# Patient Record
Sex: FEMALE | Race: OTHER | HISPANIC OR LATINO | Employment: UNEMPLOYED | URBAN - METROPOLITAN AREA
[De-identification: names, ages, dates, MRNs, and addresses within clinical notes are randomized per-mention and may not be internally consistent; named-entity substitution may affect disease eponyms.]

---

## 2023-01-03 ENCOUNTER — APPOINTMENT (EMERGENCY)
Dept: ULTRASOUND IMAGING | Facility: HOSPITAL | Age: 28
End: 2023-01-03

## 2023-01-03 ENCOUNTER — HOSPITAL ENCOUNTER (EMERGENCY)
Facility: HOSPITAL | Age: 28
Discharge: HOME | End: 2023-01-03
Attending: INTERNAL MEDICINE

## 2023-01-03 VITALS
DIASTOLIC BLOOD PRESSURE: 70 MMHG | TEMPERATURE: 98.2 F | RESPIRATION RATE: 18 BRPM | SYSTOLIC BLOOD PRESSURE: 125 MMHG | HEART RATE: 88 BPM | WEIGHT: 211.8 LBS | OXYGEN SATURATION: 98 %

## 2023-01-03 DIAGNOSIS — L08.9 SKIN INFECTION: ICD-10-CM

## 2023-01-03 DIAGNOSIS — R10.2 PELVIC PAIN: ICD-10-CM

## 2023-01-03 DIAGNOSIS — O23.40 UTI (URINARY TRACT INFECTION) DURING PREGNANCY: ICD-10-CM

## 2023-01-03 DIAGNOSIS — O20.0 THREATENED MISCARRIAGE: Primary | ICD-10-CM

## 2023-01-03 LAB
ABO GROUP BLD: NORMAL
ALBUMIN SERPL BCP-MCNC: 3.4 G/DL (ref 3.5–5)
ALP SERPL-CCNC: 73 U/L (ref 43–122)
ALT SERPL W P-5'-P-CCNC: 34 U/L
ANION GAP SERPL CALCULATED.3IONS-SCNC: 5 MMOL/L (ref 5–14)
AST SERPL W P-5'-P-CCNC: 22 U/L (ref 14–36)
B-HCG SERPL-ACNC: ABNORMAL MIU/ML
BACTERIA UR QL AUTO: ABNORMAL /HPF
BASOPHILS # BLD AUTO: 0.03 THOUSANDS/ÂΜL (ref 0–0.1)
BASOPHILS NFR BLD AUTO: 0 % (ref 0–1)
BILIRUB SERPL-MCNC: 0.44 MG/DL (ref 0.2–1)
BILIRUB UR QL STRIP: NEGATIVE
BLD GP AB SCN SERPL QL: NEGATIVE
BUN SERPL-MCNC: 11 MG/DL (ref 5–25)
CALCIUM ALBUM COR SERPL-MCNC: 9.1 MG/DL (ref 8.3–10.1)
CALCIUM SERPL-MCNC: 8.6 MG/DL (ref 8.4–10.2)
CHLORIDE SERPL-SCNC: 106 MMOL/L (ref 96–108)
CLARITY UR: CLEAR
CO2 SERPL-SCNC: 25 MMOL/L (ref 21–32)
COLOR UR: ABNORMAL
CREAT SERPL-MCNC: 0.65 MG/DL (ref 0.6–1.2)
EOSINOPHIL # BLD AUTO: 0.11 THOUSAND/ÂΜL (ref 0–0.61)
EOSINOPHIL NFR BLD AUTO: 1 % (ref 0–6)
ERYTHROCYTE [DISTWIDTH] IN BLOOD BY AUTOMATED COUNT: 12.6 % (ref 11.6–15.1)
GFR SERPL CREATININE-BSD FRML MDRD: 122 ML/MIN/1.73SQ M
GLUCOSE SERPL-MCNC: 98 MG/DL (ref 70–99)
GLUCOSE UR STRIP-MCNC: NEGATIVE MG/DL
HCT VFR BLD AUTO: 32.4 % (ref 34.8–46.1)
HGB BLD-MCNC: 10.4 G/DL (ref 11.5–15.4)
HGB UR QL STRIP.AUTO: 25
IMM GRANULOCYTES # BLD AUTO: 0.03 THOUSAND/UL (ref 0–0.2)
IMM GRANULOCYTES NFR BLD AUTO: 0 % (ref 0–2)
KETONES UR STRIP-MCNC: NEGATIVE MG/DL
LEUKOCYTE ESTERASE UR QL STRIP: 25
LIPASE SERPL-CCNC: 65 U/L (ref 23–300)
LYMPHOCYTES # BLD AUTO: 1.8 THOUSANDS/ÂΜL (ref 0.6–4.47)
LYMPHOCYTES NFR BLD AUTO: 23 % (ref 14–44)
MCH RBC QN AUTO: 27.7 PG (ref 26.8–34.3)
MCHC RBC AUTO-ENTMCNC: 32.1 G/DL (ref 31.4–37.4)
MCV RBC AUTO: 86 FL (ref 82–98)
MONOCYTES # BLD AUTO: 0.64 THOUSAND/ÂΜL (ref 0.17–1.22)
MONOCYTES NFR BLD AUTO: 8 % (ref 4–12)
MUCOUS THREADS UR QL AUTO: ABNORMAL
NEUTROPHILS # BLD AUTO: 5.3 THOUSANDS/ÂΜL (ref 1.85–7.62)
NEUTS SEG NFR BLD AUTO: 68 % (ref 43–75)
NITRITE UR QL STRIP: NEGATIVE
NON-SQ EPI CELLS URNS QL MICRO: ABNORMAL /HPF
NRBC BLD AUTO-RTO: 0 /100 WBCS
PH UR STRIP.AUTO: 6 [PH]
PLATELET # BLD AUTO: 259 THOUSANDS/UL (ref 149–390)
PMV BLD AUTO: 9 FL (ref 8.9–12.7)
POTASSIUM SERPL-SCNC: 3.9 MMOL/L (ref 3.5–5.3)
PROT SERPL-MCNC: 6.9 G/DL (ref 6.4–8.4)
PROT UR STRIP-MCNC: ABNORMAL MG/DL
RBC # BLD AUTO: 3.76 MILLION/UL (ref 3.81–5.12)
RBC #/AREA URNS AUTO: ABNORMAL /HPF
RH BLD: POSITIVE
SODIUM SERPL-SCNC: 136 MMOL/L (ref 135–147)
SP GR UR STRIP.AUTO: 1.02 (ref 1–1.04)
SPECIMEN EXPIRATION DATE: NORMAL
UROBILINOGEN UA: NEGATIVE MG/DL
WBC # BLD AUTO: 7.91 THOUSAND/UL (ref 4.31–10.16)
WBC #/AREA URNS AUTO: ABNORMAL /HPF

## 2023-01-03 RX ORDER — CEPHALEXIN 500 MG/1
500 CAPSULE ORAL EVERY 12 HOURS SCHEDULED
Qty: 14 CAPSULE | Refills: 0 | Status: SHIPPED | OUTPATIENT
Start: 2023-01-03 | End: 2023-01-10

## 2023-01-03 NOTE — ED PROVIDER NOTES
History  Chief Complaint   Patient presents with   • Vaginal Bleeding - Pregnant     States LMP 22, found out pregnant about 4 weeks ago  Today started with vaginal bleeding and lower abd pain  • Abscess     Also has large open abscess to lower left abd  Area with puss drainage  States x3-4 days     HPI  28-year-old female who is  presents to ED for evaluation of lower abdominal cramping and vaginal bleeding  Patient reports her last menstrual cycle was 2022  She reports mild lower abdominal cramping  She also reports mild vaginal bleeding this morning  She has not seen an OB/GYN for this pregnancy yet  She reports 3 other previous healthy pregnancies  She reports 1   Patient also reports 1 small area in her left mid abdomen that has been draining  Contrary to chief complaint, the area is very small  Contrary to chief complaint, patient reports drainage for 2 days  She denies any pain  She states she has been putting gauze over it which helps  She denies any medications or trying any other therapies  Patient denies headache, visual changes, dizziness, fevers, chills, chest pain, palpitations, diarrhea, melena, hematochezia, dysuria, or numbness or tingling of the extremities  Patient was interviewed and examined with     None       History reviewed  No pertinent past medical history  History reviewed  No pertinent surgical history  History reviewed  No pertinent family history  I have reviewed and agree with the history as documented  E-Cigarette/Vaping     E-Cigarette/Vaping Substances     Social History     Tobacco Use   • Smoking status: Never   • Smokeless tobacco: Never   Substance Use Topics   • Alcohol use: Never   • Drug use: Never       Review of Systems   All other systems reviewed and are negative  Physical Exam  Physical Exam  Vitals and nursing note reviewed  Constitutional:       General: She is not in acute distress  Appearance: She is well-developed  HENT:      Head: Normocephalic and atraumatic  Eyes:      Conjunctiva/sclera: Conjunctivae normal    Cardiovascular:      Rate and Rhythm: Normal rate and regular rhythm  Heart sounds: No murmur heard  Pulmonary:      Effort: Pulmonary effort is normal  No respiratory distress  Breath sounds: Normal breath sounds  Abdominal:      Palpations: Abdomen is soft  Tenderness: There is no abdominal tenderness  Comments: Small area of fluctuance with open drainage, mild surrounding erythema, no streaking   Musculoskeletal:         General: No swelling  Cervical back: Neck supple  Skin:     General: Skin is warm and dry  Capillary Refill: Capillary refill takes less than 2 seconds  Findings: Abscess present  Neurological:      Mental Status: She is alert     Psychiatric:         Mood and Affect: Mood normal          Vital Signs  ED Triage Vitals [01/03/23 0823]   Temperature Pulse Respirations Blood Pressure SpO2   98 2 °F (36 8 °C) 90 20 127/66 98 %      Temp Source Heart Rate Source Patient Position - Orthostatic VS BP Location FiO2 (%)   Oral Monitor Sitting Left arm --      Pain Score       --           Vitals:    01/03/23 0823 01/03/23 1037   BP: 127/66 125/70   Pulse: 90 88   Patient Position - Orthostatic VS: Sitting Lying         Visual Acuity      ED Medications  Medications - No data to display    Diagnostic Studies  Results Reviewed     Procedure Component Value Units Date/Time    Urine Microscopic [[id number]]  (Abnormal) Collected: 01/03/23 0847    Lab Status: Final result Specimen: Urine, Clean Catch Updated: 01/03/23 1038     RBC, UA 1-2 /hpf      WBC, UA 1-2 /hpf      Epithelial Cells Moderate /hpf      Bacteria, UA Moderate /hpf      MUCUS THREADS Moderate     URINE COMMENT --    hCG, quantitative [[id number]]  (Abnormal) Collected: 01/03/23 0854    Lab Status: Final result Specimen: Blood from Arm, Right Updated: 01/03/23 9392 HCG, Quant 61,288 mIU/mL     Narrative:       Expected Ranges:     Approximate               Approximate HCG  Gestation age          Concentration ( mIU/mL)  _____________          ______________________   Luis Woodall                      HCG values  0 2-1                       5-50  1-2                           2-3                         100-5000  3-4                         500-37107  4-5                         1000-04954  5-6                         [id number]  6-8                         61244-907133  8-12                        [id number]      UA w Reflex to Microscopic w Reflex to Culture [[id number]]  (Abnormal) Collected: 01/03/23 0847    Lab Status: Final result Specimen: Urine, Clean Catch Updated: 01/03/23 0916     Color, UA Janice     Clarity, UA Clear     Specific [place], UA 1 020     pH, UA 6 0     Leukocytes, UA 25 0     Nitrite, UA Negative     Protein, UA 15 (Trace) mg/dl      Glucose, UA Negative mg/dl      Ketones, UA Negative mg/dl      Bilirubin, UA Negative     Occult Blood, UA 25 0     URINE COMMENT --     UROBILINOGEN UA Negative mg/dL     Urine culture [[id number]] Collected: 01/03/23 0847    Lab Status:  In process Specimen: Urine, Clean Catch Updated: 01/03/23 0916    Lipase [[id number]]  (Normal) Collected: 01/03/23 0854    Lab Status: Final result Specimen: Blood from Arm, Right Updated: 01/03/23 0910     Lipase 65 u/L     Comprehensive metabolic panel [[id number]]  (Abnormal) Collected: 01/03/23 0854    Lab Status: Final result Specimen: Blood from Arm, Right Updated: 01/03/23 0910     Sodium 136 mmol/L      Potassium 3 9 mmol/L      Chloride 106 mmol/L      CO2 25 mmol/L      ANION GAP 5 mmol/L      BUN 11 mg/dL      Creatinine 0 65 mg/dL      Glucose 98 mg/dL      Calcium 8 6 mg/dL      Corrected Calcium 9 1 mg/dL      AST 22 U/L      ALT 34 U/L      Alkaline Phosphatase 73 U/L      Total Protein 6 9 g/dL      Albumin 3 4 g/dL      Total Bilirubin 0 44 mg/dL      eGFR 122 ml/min/1 73sq m     Narrative:      National Kidney Disease Foundation guidelines for Chronic Kidney Disease (CKD):   •  Stage 1 with normal or high GFR (GFR > 90 mL/min/1 73 square meters)  •  Stage 2 Mild CKD (GFR = 60-89 mL/min/1 73 square meters)  •  Stage 3A Moderate CKD (GFR = 45-59 mL/min/1 73 square meters)  •  Stage 3B Moderate CKD (GFR = 30-44 mL/min/1 73 square meters)  •  Stage 4 Severe CKD (GFR = 15-29 mL/min/1 73 square meters)  •  Stage 5 End Stage CKD (GFR <15 mL/min/1 73 square meters)  Note: GFR calculation is accurate only with a steady state creatinine    CBC and differential [[id number]]  (Abnormal) Collected: 01/03/23 0854    Lab Status: Final result Specimen: Blood from Arm, Right Updated: 01/03/23 0900     WBC 7 91 Thousand/uL      RBC 3 76 Million/uL      Hemoglobin 10 4 g/dL      Hematocrit 32 4 %      MCV 86 fL      MCH 27 7 pg      MCHC 32 1 g/dL      RDW 12 6 %      MPV 9 0 fL      Platelets 099 Thousands/uL      nRBC 0 /100 WBCs      Neutrophils Relative 68 %      Immat GRANS % 0 %      Lymphocytes Relative 23 %      Monocytes Relative 8 %      Eosinophils Relative 1 %      Basophils Relative 0 %      Neutrophils Absolute 5 30 Thousands/µL      Immature Grans Absolute 0 03 Thousand/uL      Lymphocytes Absolute 1 80 Thousands/µL      Monocytes Absolute 0 64 Thousand/µL      Eosinophils Absolute 0 11 Thousand/µL      Basophils Absolute 0 03 Thousands/µL                  US OB < 14 weeks with transvaginal   Final Result by Barbie Ridley MD (01/03 1013)      Single live intrauterine gestation at 7 weeks 5 days       MAME of 8/17/2023              Workstation performed: [id number]                    Procedures  Procedures         ED Course  ED Course as of 01/03/23 1145   Tue Jan 03, 2023   1008 HCG QUANTITATIVE(!): 61,288   1008 Rh Factor: Positive   1009 Hemoglobin(!): 10 4  No previous Hb, would recommend rechecking at next visit or sooner if she becomes symptomatic    1010 Leukocytes, UA(!): 25 0   1010 Blood, UA(!): 25 0   1028  OB IMPRESSION:  Single live intrauterine gestation at 7 weeks 5 days                                SBIRT 22yo+    Flowsheet Row Most Recent Value   SBIRT (23 yo +)    In order to provide better care to our patients, we are screening all of our patients for alcohol and drug use  Would it be okay to ask you these screening questions? No Filed at: 2023 8672                    Medical Decision Making  31-year-old female who is  presents to ED for evaluation of lower abdominal cramping and vaginal bleeding  Presentation most consistent with threatened miscarriage  Will rule out ectopic pregnancy as well  Will check UA for asymptomatic UTI in pregnancy  Will obtain labs  Patient has no pain out of proportion to exam or worsening of pain with or shortly after eating  Pain not colicky  No peritoneal signs on exam   Patient has no pulsatile abdominal mass, known aneurysm, pulse deficit or asymmetry, tearing or ripping pain  No tenderness at McBurney's point and no positive Barrera sign, or Rovsing sign  No radiation of pain from flank to groin, CVA tenderness, urinary complains, or history of urolithiasis  No history of abdominal trauma or sickle cell disease  Skin infection: self-limited or minor problem  Threatened miscarriage: undiagnosed new problem with uncertain prognosis     Details: Patient struck to follow-up with OB/GYN in 2 to 3 days  Return to the ER for any new or worsening symptoms  UTI (urinary tract infection) during pregnancy: acute illness or injury     Details: Patient starting Keflex  Amount and/or Complexity of Data Reviewed  External Data Reviewed: labs and radiology  Labs: ordered  Decision-making details documented in ED Course  Radiology: ordered  Decision-making details documented in ED Course  Risk  OTC drugs  Prescription drug management            Disposition  Final diagnoses:   Threatened miscarriage UTI (urinary tract infection) during pregnancy   Skin infection     Time reflects when diagnosis was documented in both MDM as applicable and the Disposition within this note     Time User Action Codes Description Comment    1/3/2023  9:55 AM Nicole Crisp R Add [R10 2] Pelvic pain     1/3/2023 10:48 AM Dorice Lawn Add [O20 0] Threatened miscarriage     1/3/2023 10:49 AM Dorice Lawn Add [O23 40] UTI (urinary tract infection) during pregnancy     1/3/2023 11:09 AM Dorice Lawn Add [L08 9] Skin infection       ED Disposition     ED Disposition   Discharge    Condition   Stable    Date/Time   Tue Boogie 3, 2023 10:48 AM    Comment   Anuja Parada discharge to home/self care  Follow-up Information     Follow up With Specialties Details Why Contact Info Additional [address] [address] [facility] Schedule an appointment as soon as possible for a visit in 2 days  [address] [facility] [address]  [address], [address] [phone]          Discharge Medication List as of 1/3/2023 10:51 AM      START taking these medications    Details   cephalexin (KEFLEX) 500 mg capsule Take 1 capsule (500 mg total) by mouth every 12 (twelve) hours for 7 days, Starting Tue 1/3/2023, Until Tue 1/10/2023, Normal             No discharge procedures on file      PDMP Review     None          ED Provider  Electronically Signed by           Jazzy Pena MD  01/03/23 0664

## 2023-01-03 NOTE — Clinical Note
Richbetsey English was seen and treated in our emergency department on 1/3/2023  Diagnosis:     David Reyes  is off the rest of the shift today  She may return on this date: If you have any questions or concerns, please don't hesitate to call        Yolande Balbuena MD    ______________________________           _______________          _______________  [facility] Representative                              Date                                Time

## 2023-01-04 LAB — BACTERIA UR CULT: NORMAL

## 2023-01-24 ENCOUNTER — HOSPITAL ENCOUNTER (EMERGENCY)
Facility: HOSPITAL | Age: 28
Discharge: HOME | End: 2023-01-24
Attending: STUDENT IN AN ORGANIZED HEALTH CARE EDUCATION/TRAINING PROGRAM

## 2023-01-24 VITALS
WEIGHT: 206.9 LBS | RESPIRATION RATE: 16 BRPM | TEMPERATURE: 98 F | SYSTOLIC BLOOD PRESSURE: 129 MMHG | OXYGEN SATURATION: 99 % | DIASTOLIC BLOOD PRESSURE: 62 MMHG | HEART RATE: 76 BPM

## 2023-01-24 DIAGNOSIS — L03.90 CELLULITIS: Primary | ICD-10-CM

## 2023-01-24 RX ORDER — AMOXICILLIN AND CLAVULANATE POTASSIUM 875; 125 MG/1; MG/1
1 TABLET, FILM COATED ORAL EVERY 12 HOURS
Qty: 14 TABLET | Refills: 0 | Status: SHIPPED | OUTPATIENT
Start: 2023-01-24 | End: 2023-01-31

## 2023-01-24 NOTE — ED PROVIDER NOTES
History  Chief Complaint   Patient presents with   • Abscess     Notes past week of developing abscess on stomach  First they came out on stomach and then on the 3rd right finger; noted hand swelling and redness around abscess on hand; healing over on stomach  Patient is a 52-year-old female who believes she is about 10 to 12 weeks pregnant presenting for evaluation of abscesses  She reports that she developed 2 abscesses on her left abdomen about a month ago that are currently crusting and healing  These lesions have bled and produced white pus in the past   She then developed an abscess on her right middle finger 4 to 5 days ago  The right middle finger is swollen and erythematous  No bleeding or drainage from the finger  Reports subjective fever last night  No known trauma or bug bites  No other lesions or rashes  Denies abdominal pain, vaginal bleeding, leakage of fluids  Denies CP, SOB, other symptoms  Denies IVDU  No medications prior to arrival           None       History reviewed  No pertinent past medical history  History reviewed  No pertinent surgical history  History reviewed  No pertinent family history  I have reviewed and agree with the history as documented  E-Cigarette/Vaping   • E-Cigarette Use Never User      E-Cigarette/Vaping Substances     Social History     Tobacco Use   • Smoking status: Never   • Smokeless tobacco: Never   Vaping Use   • Vaping Use: Never used   Substance Use Topics   • Alcohol use: Never   • Drug use: Never       Review of Systems   Constitutional: Positive for chills  Negative for fever  HENT: Negative for congestion, drooling, ear pain and sore throat  Eyes: Negative for pain and visual disturbance  Respiratory: Negative for cough and shortness of breath  Cardiovascular: Negative for chest pain and palpitations  Gastrointestinal: Negative for abdominal pain, nausea and vomiting     Genitourinary: Negative for dysuria, hematuria, pelvic pain, vaginal bleeding and vaginal discharge  Musculoskeletal: Negative for arthralgias and back pain  Skin: Positive for wound  Negative for color change and rash  Neurological: Negative for seizures and syncope  All other systems reviewed and are negative  Physical Exam  Physical Exam  Vitals and nursing note reviewed  Constitutional:       General: She is not in acute distress  Appearance: She is well-developed  HENT:      Head: Normocephalic and atraumatic  Eyes:      Conjunctiva/sclera: Conjunctivae normal    Cardiovascular:      Rate and Rhythm: Normal rate and regular rhythm  Heart sounds: No murmur heard  Pulmonary:      Effort: Pulmonary effort is normal  No respiratory distress  Breath sounds: Normal breath sounds  Abdominal:      Palpations: Abdomen is soft  Tenderness: There is no abdominal tenderness  Musculoskeletal:         General: No swelling  Right hand: Swelling and tenderness present  Normal range of motion  Normal strength  Normal sensation  Normal pulse  Left hand: Normal       Cervical back: Neck supple  Skin:     General: Skin is warm and dry  Capillary Refill: Capillary refill takes less than 2 seconds  Comments: Two areas on left anterior abdomen with healing granulation tissue, mild surrounding erythema  No drainage or bleeding  No fluctuance  Acutely erythematous blanching lesion with central puncture wound on right middle finger  No drainage or bleeding  No fluctuance  Neurological:      Mental Status: She is alert     Psychiatric:         Mood and Affect: Mood normal                   Vital Signs  ED Triage Vitals [01/24/23 1407]   Temperature Pulse Respirations Blood Pressure SpO2   98 °F (36 7 °C) 76 16 129/62 99 %      Temp Source Heart Rate Source Patient Position - Orthostatic VS BP Location FiO2 (%)   Oral Monitor Sitting Left arm --      Pain Score       10 - Worst Possible Pain           Vitals:    01/24/23 1407 BP: 129/62   Pulse: 76   Patient Position - Orthostatic VS: Sitting         Visual Acuity      ED Medications  Medications - No data to display    Diagnostic Studies  Results Reviewed     None                 No orders to display              Procedures  Procedures         ED Course                               SBIRT 22yo+    Flowsheet Row Most Recent Value   SBIRT (25 yo +)    In order to provide better care to our patients, we are screening all of our patients for alcohol and drug use  Would it be okay to ask you these screening questions? No Filed at: 01/24/2023 1421                    Medical Decision Making  Patient is a 27-year-old female who is 10-12 weeks pregnant presenting for evaluation of abscesses  She developed 2 abscesses on her left anterior abdomen about a month ago that are currently healing  She has a lesion on her right middle finger that developed 4 to 5 days ago that is erythematous and swollen  No known trauma or bug bites  Reports subjective fever starting last night  No meds prior to arrival   Denies IV drug use  No abdominal pain, vaginal bleeding, leakage of fluids, other pregnancy related complaints  On exam, patient is well-appearing  She is afebrile in the ED, all vital signs stable  2 healing lesions present on her abdomen without drainage or bleeding  Right middle finger is acutely erythematous with a central puncture lesion  Right hand is neurovascularly intact  Full active range of motion  This is likely a cellulitis of the finger, cause unknown  No area of fluctuance in finger or abdomen indicating abscess that should be drained in the emergency department  Patient denies IV drug use, CP, SOB, and is afebrile - lower suspicion for septic emboli  Prescribed Augmentin to treat cellulitis  No trauma indicating need for x-ray  Provided contact information for patient to establish with a PCP for wound recheck within the next week   Return precautions discussed with patient as outlined in AVS and she verbally expressed understanding  Patient stable at time of discharge and ambulated out of the emergency department  Cellulitis: complicated acute illness or injury  Risk  Prescription drug management  Disposition  Final diagnoses:   Cellulitis     Time reflects when diagnosis was documented in both MDM as applicable and the Disposition within this note     Time User Action Codes Description Comment    1/24/2023  2:41 PM Vkiy Aviles Carlie [L03 90] Cellulitis       ED Disposition     ED Disposition   Discharge    Condition   Stable    Date/Time   Tue Jan 24, 2023  2:40 PM    Comment   Ronit Marin discharge to home/self care  Follow-up Information     Follow up With Specialties Details Why Contact Info Additional [address] 2[address] Floor Family Medicine Schedule an appointment as soon as possible for a visit in 3 days For wound re-check [address] [facility] Emergency Department Emergency Medicine Go to  If symptoms worsen [address] [address] [address] [facility] Emergency Department          Patient's Medications   Discharge Prescriptions    AMOXICILLIN-CLAVULANATE (AUGMENTIN) 875-125 MG PER TABLET    Take 1 tablet by mouth every 12 (twelve) hours for 7 days       Start Date: 1/24/2023 End Date: 1/31/2023       Order Dose: 1 tablet       Quantity: 14 tablet    Refills: 0       No discharge procedures on file      PDMP Review     None          ED Provider  Electronically Signed by           Perfecto Vidal PA-C  01/24/23 1308

## 2023-01-24 NOTE — ED NOTES
Provider at bedside  iPad used with MyShape translation  ID [id number]  This nurse brought pt warm anh Shannon RN  01/24/23 9695

## 2023-01-24 NOTE — Clinical Note
Lexa Shelligo was seen and treated in our emergency department on 1/24/2023  Diagnosis:     Rios Sinclair    She may return on this date: 01/25/2023         If you have any questions or concerns, please don't hesitate to call        Gem Rico PA-C    ______________________________           _______________          _______________  Hospital Representative                              Date                                Time

## 2023-01-24 NOTE — DISCHARGE INSTRUCTIONS
Take antibiotics for 7 days  Use tylenol as needed for pain  Follow up with PCP in a few days for wound recheck

## 2023-01-27 ENCOUNTER — INITIAL PRENATAL (OUTPATIENT)
Dept: OBGYN CLINIC | Facility: MEDICAL CENTER | Age: 28
End: 2023-01-27

## 2023-01-27 ENCOUNTER — APPOINTMENT (OUTPATIENT)
Dept: LAB | Facility: MEDICAL CENTER | Age: 28
End: 2023-01-27

## 2023-01-27 VITALS
BODY MASS INDEX: 33.43 KG/M2 | SYSTOLIC BLOOD PRESSURE: 122 MMHG | DIASTOLIC BLOOD PRESSURE: 74 MMHG | WEIGHT: 208 LBS | HEIGHT: 66 IN

## 2023-01-27 DIAGNOSIS — Z34.81 PRENATAL CARE, SUBSEQUENT PREGNANCY, FIRST TRIMESTER: Primary | ICD-10-CM

## 2023-01-27 DIAGNOSIS — Z34.81 PRENATAL CARE, SUBSEQUENT PREGNANCY, FIRST TRIMESTER: ICD-10-CM

## 2023-01-27 LAB
ABO GROUP BLD: NORMAL
AMORPH URATE CRY URNS QL MICRO: NORMAL
BACTERIA UR QL AUTO: NORMAL /HPF
BASOPHILS # BLD AUTO: 0.02 THOUSANDS/ÂΜL (ref 0–0.1)
BASOPHILS NFR BLD AUTO: 0 % (ref 0–1)
BILIRUB UR QL STRIP: NEGATIVE
BLD GP AB SCN SERPL QL: NEGATIVE
CLARITY UR: ABNORMAL
COLOR UR: ABNORMAL
EOSINOPHIL # BLD AUTO: 0.09 THOUSAND/ÂΜL (ref 0–0.61)
EOSINOPHIL NFR BLD AUTO: 2 % (ref 0–6)
ERYTHROCYTE [DISTWIDTH] IN BLOOD BY AUTOMATED COUNT: 12.9 % (ref 11.6–15.1)
GLUCOSE 1H P 50 G GLC PO SERPL-MCNC: 66 MG/DL (ref 40–134)
GLUCOSE UR STRIP-MCNC: NEGATIVE MG/DL
HBV SURFACE AG SER QL: NORMAL
HCT VFR BLD AUTO: 36.1 % (ref 34.8–46.1)
HGB BLD-MCNC: 11.6 G/DL (ref 11.5–15.4)
HGB UR QL STRIP.AUTO: NEGATIVE
IMM GRANULOCYTES # BLD AUTO: 0.04 THOUSAND/UL (ref 0–0.2)
IMM GRANULOCYTES NFR BLD AUTO: 1 % (ref 0–2)
KETONES UR STRIP-MCNC: NEGATIVE MG/DL
LEUKOCYTE ESTERASE UR QL STRIP: NEGATIVE
LYMPHOCYTES # BLD AUTO: 1.8 THOUSANDS/ÂΜL (ref 0.6–4.47)
LYMPHOCYTES NFR BLD AUTO: 31 % (ref 14–44)
MCH RBC QN AUTO: 27.8 PG (ref 26.8–34.3)
MCHC RBC AUTO-ENTMCNC: 32.1 G/DL (ref 31.4–37.4)
MCV RBC AUTO: 86 FL (ref 82–98)
MONOCYTES # BLD AUTO: 0.44 THOUSAND/ÂΜL (ref 0.17–1.22)
MONOCYTES NFR BLD AUTO: 8 % (ref 4–12)
NEUTROPHILS # BLD AUTO: 3.42 THOUSANDS/ÂΜL (ref 1.85–7.62)
NEUTS SEG NFR BLD AUTO: 58 % (ref 43–75)
NITRITE UR QL STRIP: NEGATIVE
NON-SQ EPI CELLS URNS QL MICRO: NORMAL /HPF
NRBC BLD AUTO-RTO: 0 /100 WBCS
PH UR STRIP.AUTO: 5.5 [PH]
PLATELET # BLD AUTO: 253 THOUSANDS/UL (ref 149–390)
PMV BLD AUTO: 9.9 FL (ref 8.9–12.7)
PROT UR STRIP-MCNC: ABNORMAL MG/DL
RBC # BLD AUTO: 4.18 MILLION/UL (ref 3.81–5.12)
RBC #/AREA URNS AUTO: NORMAL /HPF
RH BLD: POSITIVE
RUBV IGG SERPL IA-ACNC: 131.2 IU/ML
SP GR UR STRIP.AUTO: 1.03 (ref 1–1.03)
SPECIMEN EXPIRATION DATE: NORMAL
UROBILINOGEN UR STRIP-ACNC: <2 MG/DL
WBC # BLD AUTO: 5.81 THOUSAND/UL (ref 4.31–10.16)
WBC #/AREA URNS AUTO: NORMAL /HPF
WBC CLUMPS # UR AUTO: PRESENT /UL

## 2023-01-27 NOTE — PROGRESS NOTES
[id number]  OB History    Para Term  AB Living   4 2 1 1 1 2   SAB IAB Ectopic Multiple Live Births           2      # Outcome Date GA Lbr Kasi/2nd Weight Sex Delivery Anes PTL Lv   4 Current            3 AB 2022 6w0d          2  18 36w0d  2268 g (5 lb) M Vag-Spont   MARILYN      Birth Comments: went to [facility] and delivered in 5 hours   1 Term 11 41w0d  5443 g (12 lb) M    MARILYN      Birth Comments: [place]         * Pt presents for OB intake  *[id number]  *Pt's LMP was Patient's last menstrual period was 2022 (exact date)  *Ultrasound date:1/3/2023   7weeks 5days  *Estimated date of delivery: 2023   * confirmed by us    *Signs/Symptoms of Pregnancy   *no Constipation    *no Headaches   *no Cramping  *no Spotting   Diabetes     *no Hx of GDM    *no BMI >35    *no First degree relative with type 2 diabetes    *yes Prior hx of LGA/macrosomia   Hypertension-   *no Hx of chronic HTN    *no Hx of gestational HTN   *no hx of preeclampsia, eclampsia, or HELLP syndrome   *Infection Screening   *no Does the pt have a hx of MRSA? *no History of herpes?    *Immunizations:   *yes Discussed influenza vaccine     declined   *yes Discussed TDaP vaccine   *yes COVID Vaccine  Received       SOCIOECONOMIC:  Applying for MA  Resources for nfp provided  Elder  Depression *n   Anxiety*n  Medications*n  *Interview education   *Handouts given:    *Baby and Me support center     *MyChart sign up instructions    *Lab Locations    *[facility]ians List/Choosing Pediatrician Sheet    *[facility] Childbirth and Parenting Classes    *Schedule for Prenatal Visits    *Pregnancy Warning Signs Reviewed    *Safe Medications During Pregnancy    *SMA and CF Testing information sheet    *CPT Code Sheet/MFM 13week NT pamphlet    *[employer]   SBIRT Screen:  Depression Screening Follow-up Plan: Patient's depression screening was negative with an [place] score of  3    *[facility]   *yes discussed genetic testing- pt interested   Patient has been informed of basic prenatal advice such as avoiding alcohol, drugs, and smoking  She should remain hydrated and take daily prenatal vitamins  Patient should avoid caffeine, raw sprouts, high mercury fish, undercooked fish, raw eggs, organ meat, unwashed produce, and unpasteurized cheeses, milk, and fruit juice and undercooked meats  She has been informed about toxoplasmosis and to avoid cat feces  *Details that I feel the provider should be aware of:  Rios Sinclair was seen for her ob intake at 11w1d  interpretor used for apt  Pt recently moved here alone from [place]  She is renting a one bedroom apartment and will soon have her other 2 children and boyfriend move to be with her  States she has moved her for work  Does not have any family in the area and does not have insurance  Pt advised to apply for ma through state site and was given resources  Pt will be applying for MA after apt  Referral for Nurse family partnership placed as well for additional resources  Pt states that she had a 12 pound baby with her first and that her second was born on the day she turned 8 months  Unable to give me possible weeks she was at that time and states it might have been at 36 weeks  States that she [facility] [place] and she was too far along to stop baby from coming and had him a few hours later  Had both previous children in [place]  Patient filing out paperwork to receive all of her medical records  She will also attempt to call and have them fax all her information as well  PN1 visit scheduled for *2/9/2023  The patient was oriented to our practice and all questions were answered      Interviewed by:  April Cedillo

## 2023-01-28 LAB
BACTERIA UR CULT: NORMAL
HIV 1+2 AB+HIV1 P24 AG SERPL QL IA: NORMAL
HIV 2 AB SERPL QL IA: NORMAL
HIV1 AB SERPL QL IA: NORMAL
HIV1 P24 AG SERPL QL IA: NORMAL
RPR SER QL: NORMAL

## 2023-02-02 PROBLEM — O09.899 HISTORY OF PRETERM DELIVERY, CURRENTLY PREGNANT: Status: ACTIVE | Noted: 2023-02-02

## 2023-02-03 ENCOUNTER — ROUTINE PRENATAL (OUTPATIENT)
Facility: HOSPITAL | Age: 28
End: 2023-02-03
Attending: STUDENT IN AN ORGANIZED HEALTH CARE EDUCATION/TRAINING PROGRAM

## 2023-02-03 VITALS
HEIGHT: 66 IN | HEART RATE: 88 BPM | WEIGHT: 207.67 LBS | SYSTOLIC BLOOD PRESSURE: 116 MMHG | DIASTOLIC BLOOD PRESSURE: 64 MMHG | BODY MASS INDEX: 33.38 KG/M2

## 2023-02-03 DIAGNOSIS — O09.899 HISTORY OF PRETERM DELIVERY, CURRENTLY PREGNANT: Primary | ICD-10-CM

## 2023-02-03 DIAGNOSIS — Z3A.12 12 WEEKS GESTATION OF PREGNANCY: ICD-10-CM

## 2023-02-03 DIAGNOSIS — Z34.81 PRENATAL CARE, SUBSEQUENT PREGNANCY, FIRST TRIMESTER: ICD-10-CM

## 2023-02-03 DIAGNOSIS — Z36.82 ENCOUNTER FOR (NT) NUCHAL TRANSLUCENCY SCAN: ICD-10-CM

## 2023-02-03 PROBLEM — O99.210 OBESITY AFFECTING PREGNANCY: Status: ACTIVE | Noted: 2023-02-03

## 2023-02-03 PROBLEM — O09.291 PRIOR FETAL MACROSOMIA IN FIRST TRIMESTER, ANTEPARTUM: Status: ACTIVE | Noted: 2023-02-03

## 2023-02-03 NOTE — PATIENT INSTRUCTIONS
Your vaginal progesterone prescription was sent to [facility], [address])  As a reminder you will have to self-pay for this medication, some insurance companies may reimburse if you submit receipts to them  Please contact the pharmacy directly to arrange pick-up

## 2023-02-03 NOTE — LETTER
February 3, 2023     [address] Farooq Roe MD  [address] [place]    Patient: Doug Chadwick   YOB: 1995   Date of Visit: 2/3/2023       Dear Dr Farooq Roe: Thank you for referring Doug Chadwick to me for evaluation  Below are my notes for this consultation  If you have questions, please do not hesitate to call me  I look forward to following your patient along with you  Sincerely,        Lucina Salazar MD        CC: No Recipients  Lucina Salazar MD  2/3/2023  1:02 PM  Sign when Signing Visit  [address]: Ms Rossie Felty was seen today at 12w1d for nuchal translucency ultrasound  See ultrasound report under "OB Procedures" tab  My recommendations are as follows:  1  We reviewed the availability of genetic screening, as well as diagnostic testing, which are available to all pregnant women  We reviewed limitations, risks, and benefits of screening and testing  She elected to proceed with Non Invasive Prenatal Screening (NIPS) but could not stay to have it drawn today and elected to have it at her next [facility] ultrasound  We discussed self-pay rate of $99 given she does not have insurance at this time  MSAFP screening should be ordered through your office at 15-20 weeks gestation, and completed prior to fetal anatomic survey  She does not wish to pursue diagnostic testing at this time  A detailed anatomic survey as well as transvaginal cervical length screening are recommended between 18-22 weeks gestation  2  We reviewed her history of spontaneous  delivery, which increases her risk for  birth in this pregnancy  In women with a history of spontaneous  birth, treatment with progesterone therapy from 16-36 weeks gestation may reduce recurrence risk   Studies evaluating the effectiveness of progesterone therapy in reducing recurrence risk for  delivery have produced conflicting results regarding effectiveness, but have not suggested harm with treatment  Progesterone can either be given as a weekly injection (IM or SQ) of 17-OH progesterone caproate, or as a vaginal suppository of 200mg daily  Either route of progesterone is a reasonable option endorsed by the Society for Maternal-Fetal Medicine  Serial cervical surveillance (by transvaginal sonography) is helpful in predicting risk of  delivery  Measurement of transvaginal cervical length is recommended between 16 0/7 and 23 6/7 weeks gestation  Cervical length equal to or less than 25mm before 24 weeks gestation would prompt recommendation for a transvaginal ultrasound-indicated cerclage  In women with a history of spontaneous  delivery, approximately 1/3 will require cerclage placement, and 2/3 will have normal cervical length screening and need progesterone therapy alone  Surveillance for signs and symptoms of  labor and rupture of membranes are recommended during the pregnancy  After our discussion, she plans to pursue serial cervical surveillance and vaginal progesterone (was prescribed by me to [address] [place] [address])  3  Regarding her history of a large for gestational age baby, she completed a normal early glucola  Third trimester evaluation of fetal weight is advised       Please don't hesitate to contact our office with any concerns or questions     -Kwame Hansen MD

## 2023-02-03 NOTE — PROGRESS NOTES
[address]: Ms Miguel Ángel Hoskins was seen today at 12w1d for nuchal translucency ultrasound  See ultrasound report under "OB Procedures" tab  My recommendations are as follows:  1  We reviewed the availability of genetic screening, as well as diagnostic testing, which are available to all pregnant women  We reviewed limitations, risks, and benefits of screening and testing  She elected to proceed with Non Invasive Prenatal Screening (NIPS) but could not stay to have it drawn today and elected to have it at her next [facility] ultrasound  We discussed self-pay rate of $99 given she does not have insurance at this time  MSAFP screening should be ordered through your office at 15-20 weeks gestation, and completed prior to fetal anatomic survey  She does not wish to pursue diagnostic testing at this time  A detailed anatomic survey as well as transvaginal cervical length screening are recommended between 18-22 weeks gestation  2  We reviewed her history of spontaneous  delivery, which increases her risk for  birth in this pregnancy  In women with a history of spontaneous  birth, treatment with progesterone therapy from 16-36 weeks gestation may reduce recurrence risk  Studies evaluating the effectiveness of progesterone therapy in reducing recurrence risk for  delivery have produced conflicting results regarding effectiveness, but have not suggested harm with treatment  Progesterone can either be given as a weekly injection (IM or SQ) of 17-OH progesterone caproate, or as a vaginal suppository of 200mg daily  Either route of progesterone is a reasonable option endorsed by the Society for Maternal-Fetal Medicine  Serial cervical surveillance (by transvaginal sonography) is helpful in predicting risk of  delivery  Measurement of transvaginal cervical length is recommended between 16 0/7 and 23 6/7 weeks gestation   Cervical length equal to or less than 25mm before 24 weeks gestation would prompt recommendation for a transvaginal ultrasound-indicated cerclage  In women with a history of spontaneous  delivery, approximately 1/3 will require cerclage placement, and 2/3 will have normal cervical length screening and need progesterone therapy alone  Surveillance for signs and symptoms of  labor and rupture of membranes are recommended during the pregnancy  After our discussion, she plans to pursue serial cervical surveillance and vaginal progesterone (was prescribed by me to [address] [place] [address])  3  Regarding her history of a large for gestational age baby, she completed a normal early glucola  Third trimester evaluation of fetal weight is advised       Please don't hesitate to contact our office with any concerns or questions     -Chelita Joya MD

## 2023-02-08 PROBLEM — Z3A.13 13 WEEKS GESTATION OF PREGNANCY: Status: ACTIVE | Noted: 2023-02-03

## 2023-02-28 ENCOUNTER — ROUTINE PRENATAL (OUTPATIENT)
Facility: HOSPITAL | Age: 28
End: 2023-02-28

## 2023-02-28 ENCOUNTER — APPOINTMENT (OUTPATIENT)
Facility: HOSPITAL | Age: 28
End: 2023-02-28

## 2023-02-28 VITALS
HEART RATE: 86 BPM | DIASTOLIC BLOOD PRESSURE: 60 MMHG | WEIGHT: 210.32 LBS | BODY MASS INDEX: 33.8 KG/M2 | SYSTOLIC BLOOD PRESSURE: 128 MMHG | HEIGHT: 66 IN

## 2023-02-28 DIAGNOSIS — O09.899 HISTORY OF PRETERM DELIVERY, CURRENTLY PREGNANT: Primary | ICD-10-CM

## 2023-02-28 DIAGNOSIS — Z3A.15 15 WEEKS GESTATION OF PREGNANCY: ICD-10-CM

## 2023-02-28 DIAGNOSIS — O99.211 OBESITY AFFECTING PREGNANCY IN FIRST TRIMESTER: ICD-10-CM

## 2023-02-28 DIAGNOSIS — Z33.1 PREGNANT STATE, INCIDENTAL: ICD-10-CM

## 2023-02-28 NOTE — PROGRESS NOTES
Patient chose to have [facility] Non-invasive Prenatal Screen with fetal sex  Patient given brochure and is aware [facility] will contact their insurance and coordinate coverage  Patient made aware she will need to respond to text message or e-mail from [employer] within 2 business days or testing will be run through insurance  Patient informed text message will come from area code  "415"  Provided [employer] # [phone] and web site : [email or web address]    "[place] your test online" card with barcode and test tube ID provided to patient  Reviewed [facility]'s web site states 5-7 business days for results via their portal    [employer] message will be sent to patient when MFM receives results /provider reviews  2 vials of blood drawn from right arm by Kimberlyn Burgos RN  Patient tolerated blood draw without difficulty  Specimens labeled with patient identifiers (name, date of birth, specimen collection date), order and specimen were verified with patient, packed and sent via [employer] [address]  Copy of lab order scanned to Epic media  Maternal Fetal Medicine will have results in approximately 7-10 business days and will call patient or notify via [address]  Patient aware viewing lab result online will reveal fetal sex if ordered  Patient verbalized understanding of all instructions and no questions at this time  Used  #[id number], [address], for this interaction

## 2023-02-28 NOTE — PROGRESS NOTES
Ultrasound Probe Disinfection    A transvaginal ultrasound was performed  Prior to use, disinfection was performed with High Level Disinfection Process (Trophon)  Probe serial number [id number]: [id number] was used        Robert Done  02/28/23  3:22 PM

## 2023-02-28 NOTE — LETTER
March 1, 2023     [address] eZenat Mcdowell MD  [address]    Patient: Teddy Lee   YOB: 1995   Date of Visit: 2/28/2023       Dear Dr Zeenat Mcdowell: Thank you for referring Teddy Lee to me for evaluation  Below are my notes for this consultation  If you have questions, please do not hesitate to call me  I look forward to following your patient along with you  Sincerely,        Maria Luz Barba MD        CC: No Recipients  Maria Luz Barba MD  3/1/2023  2:09 PM  Sign when Signing Visit  A fetal ultrasound was completed  See Ob procedures in Epic for an interpretation and recommendations  Do not hesitate to contact us in [facility] if you have questions  Cale Benson MD, MSCE  Maternal Fetal Medicine      Gilmar Hutton  2/28/2023  3:22 PM  Sign when Signing Visit  Patient chose to have [facility] Non-invasive Prenatal Screen with fetal sex  Patient given brochure and is aware [facility] will contact their insurance and coordinate coverage  Patient made aware she will need to respond to text message or e-mail from [employer] within 2 business days or testing will be run through insurance  Patient informed text message will come from area code  "415"  Provided [employer] # [phone] and web site : [email or web address]    "[place] your test online" card with barcode and test tube ID provided to patient  Reviewed [facility]'s web site states 5-7 business days for results via their portal    [employer] message will be sent to patient when [facility] receives results /provider reviews  2 vials of blood drawn from right arm by Kimberlyn Burgos RN  Patient tolerated blood draw without difficulty  Specimens labeled with patient identifiers (name, date of birth, specimen collection date), order and specimen were verified with patient, packed and sent via [employer]  Copy of lab order scanned to Epic media      Maternal Fetal Medicine will have results in approximately 7-10 business days and will call patient or notify via [address]  Patient aware viewing lab result online will reveal fetal sex if ordered  Patient verbalized understanding of all instructions and no questions at this time  Used  #[id number], [address] [facility], for this interaction

## 2023-03-01 PROBLEM — Z3A.15 15 WEEKS GESTATION OF PREGNANCY: Status: ACTIVE | Noted: 2023-02-03

## 2023-03-01 NOTE — PROGRESS NOTES
A fetal ultrasound was completed  See Ob procedures in Epic for an interpretation and recommendations  Do not hesitate to contact us in [facility] if you have questions  Cyndee Joseph MD, [address] [facility]  Maternal Fetal Medicine

## 2023-03-07 ENCOUNTER — ROUTINE PRENATAL (OUTPATIENT)
Dept: OBGYN CLINIC | Facility: MEDICAL CENTER | Age: 28
End: 2023-03-07

## 2023-03-07 VITALS — DIASTOLIC BLOOD PRESSURE: 78 MMHG | SYSTOLIC BLOOD PRESSURE: 122 MMHG | WEIGHT: 210 LBS | BODY MASS INDEX: 33.89 KG/M2

## 2023-03-07 DIAGNOSIS — O09.291 PRIOR FETAL MACROSOMIA IN FIRST TRIMESTER, ANTEPARTUM: ICD-10-CM

## 2023-03-07 DIAGNOSIS — O09.899 HISTORY OF PRETERM DELIVERY, CURRENTLY PREGNANT: ICD-10-CM

## 2023-03-07 DIAGNOSIS — Z3A.16 16 WEEKS GESTATION OF PREGNANCY: Primary | ICD-10-CM

## 2023-03-07 DIAGNOSIS — R00.2 PALPITATIONS: ICD-10-CM

## 2023-03-07 NOTE — PROGRESS NOTES
Jared Encarnacion is a 33y o  year old [id number] at 16w5d for first prenatal visit  Pregnancy was desired  She is currently taking PNV    Nausea No Vomiting No   Exam done today - see OB flowsheet  Pap done Yes  Gonorrhea and Chlamydia sent  Labs reviewed    Genetic testing - had NIPT (results pending) MSAFP ordered     OB complications - hx of 36 week delivery  (doing vaginal progesterone and vaginal progesterone)  She is from [facility] but was living in [place] (her 2 children are living in [place] with her mom)   FOB lives in [place]  Has level 2 scheduled     At todays visit complains about feeling palpitations at times - EKG and TSH ordered     Pt has been counseled re diet, exercise, weight gain, foods to avoid, vaccines in pregnancy, trisomy screening, travel precautions to include seat belt use and VTE risk reduction  She has been provided our pregnancy packet which includes how and when to contact providers, medication recommendations, dietary suggestions, breastfeeding information as well as websites for additional information, hospital and delivery concerns

## 2023-03-08 ENCOUNTER — TELEPHONE (OUTPATIENT)
Facility: HOSPITAL | Age: 28
End: 2023-03-08

## 2023-03-08 LAB
HPV HR 12 DNA CVX QL NAA+PROBE: NEGATIVE
HPV16 DNA CVX QL NAA+PROBE: POSITIVE
HPV18 DNA CVX QL NAA+PROBE: NEGATIVE

## 2023-03-08 NOTE — TELEPHONE ENCOUNTER
Spoke with Melvina Escoto via [employer]  ID # [id number] - Jaya  Provided She Doyle with results of her [facility] test, gender requested by patient  Umer Dukes  that gender can be confirmed at her [facility], She Doyle  verbalized understanding  She Yanira was instructed on MSAFP lab work  being ordered by [facility] and timing of test  Informed She Doyle that she has an order already in her MyChart and get the blood work done at any [address] [place] [address]  Patient receptive to information and declines further questions at this time

## 2023-03-08 NOTE — TELEPHONE ENCOUNTER
----- Message from Shell Rankin MD sent at 3/7/2023  2:17 PM EST -----  Regarding: Please review NIPS    ----- Message -----  From: Interface, Transcription Incoming  Sent: 3/7/2023  12:09 PM EST  To: Shell Rankin MD

## 2023-03-09 LAB
C TRACH DNA SPEC QL NAA+PROBE: NEGATIVE
N GONORRHOEA DNA SPEC QL NAA+PROBE: NEGATIVE

## 2023-03-14 LAB
LAB AP GYN PRIMARY INTERPRETATION: NORMAL
Lab: NORMAL
PATH INTERP SPEC-IMP: NORMAL